# Patient Record
(demographics unavailable — no encounter records)

---

## 2024-12-17 NOTE — HEALTH RISK ASSESSMENT
[Excellent] : ~his/her~  mood as  excellent [No] : No [No falls in past year] : Patient reported no falls in the past year [Little interest or pleasure doing things] : 1) Little interest or pleasure doing things [Feeling down, depressed, or hopeless] : 2) Feeling down, depressed, or hopeless [0] : 2) Feeling down, depressed, or hopeless: Not at all (0) [PHQ-2 Negative - No further assessment needed] : PHQ-2 Negative - No further assessment needed [Never] : Never [NO] : No [HIV test declined] : HIV test declined [Hepatitis C test declined] : Hepatitis C test declined [[id number]] : 0 [MammogramDate] : 4/2024

## 2025-06-09 NOTE — HISTORY OF PRESENT ILLNESS
[FreeTextEntry1] : follow up  [de-identified] : follow up htn asthma has cramp like tight ness wants mg chcck asthma controlled no chest pain sob nvd or palpitatiosn

## 2025-07-16 NOTE — HISTORY OF PRESENT ILLNESS
[Patient reported PAP Smear was normal] : Patient reported PAP Smear was normal [LMP unknown] : LMP unknown [postmenopausal] : postmenopausal [Y] : Positive pregnancy history [unknown] : Patient is unsure of the date of her LMP [Menopause Age: ____] : age at menopause was [unfilled] [No] : Patient does not have concerns regarding sex [Previously active] : previously active [TextBox_4] : Well woman visit h/o breast cancer 7 years ago-partial mastectomy R breast, chemotherapy, radiation Vaginal dryness-itching, stining,mild discomfort [Mammogramdate] : 04/16/25 [TextBox_19] : BR-2 [BreastSonogramDate] : 04/16/25 [TextBox_25] : BR-2 [PapSmeardate] : 2018 [BoneDensityDate] : 08/21/23 [TextBox_37] : OSTEOPENIA [ColonoscopyDate] : 02/14/23 [HPVDate] : 2018 [LMPDate] : at age 55 [PGHxTotal] : 4 [[facility]] : 3 [[facility] Center[facility]] : 3 [FreeTextEntry1] : C/S X3 [PGxEctopic] : 1 [FreeTextEntry4] : denies abuse